# Patient Record
(demographics unavailable — no encounter records)

---

## 2025-03-28 NOTE — DISCUSSION/SUMMARY
[de-identified] : 53-year-old female cervical myelopathy symptoms.  Ordering MRI of the cervical spine.   failed physical therapy.

## 2025-03-28 NOTE — HISTORY OF PRESENT ILLNESS
[de-identified] : 53-year-old female presents neck pain.  Radiates down her left arm she has numbness in her left hand difficulty with balance.  She has had the symptoms for about 8 months.  She has done physical therapy.  That has not helped her.  No loss of bladder or bowel.

## 2025-03-28 NOTE — IMAGING
[de-identified] : TTP midline cervical spine and paraspinal musculature   Strength                                                                     Deltoid   Right: 5/5; Left: 5/5                      Biceps   Right: 5/5; Left: 5/5                   Triceps        Right: 5/5; Left: 5/5                                 Wrist Extensors     Right: 5/5; Left: 5/5 Finger Flexors     Right: 5/5; Left: 5/5 IO    Right: 5/5; Left: 5/5  Sensation C5   Right: 2/2; Left: 2/2 C6   Right: 2/2; Left: 2/2 C7   Right: 2/2; Left: 2/2 C8   Right: 2/2; Left: 2/2 T1   Right: 2/2; Left: 2/2  Reflexes Biceps   Right: 2+; Left 2+ Triceps   Right: 2+; Left 2+ Carlisle's  Right: Negative; L: Negative